# Patient Record
(demographics unavailable — no encounter records)

---

## 2024-12-23 NOTE — DISCUSSION/SUMMARY
[EKG obtained to assist in diagnosis and management of assessed problem(s)] : EKG obtained to assist in diagnosis and management of assessed problem(s) [FreeTextEntry1] : 62 yo F w/ PMHx breast cancer (Stage 0; dx in 2020; both breasts; double mastectomy only), normal Lp(a), HLD, osteoporosis (on Reclast) who presents today to establish care.   HDL: Lp(a) normal   - Coronary artery calcium (CAC) ordered for further risk stratification. Based on all test results, we will decide on an appropriate preventive treatment regimen for the patient. - if normal, will likely consider w/ lifestyle along for an LDL goal of <100 - Encouraged patient to continue healthy exercise and eating habits, focusing on a Mediterranean style of eating w/ more plant based foods in general, and aiming for the recommended 150 minutes per week of moderate physical activity.  Will have pt make f/u in 6 months just to have it; may defer as above depending on results.

## 2024-12-23 NOTE — HISTORY OF PRESENT ILLNESS
[FreeTextEntry1] : 60 yo F w/ PMHx breast cancer (Stage 0; dx in 2020; both breasts; double mastectomy only), normal Lp(a), HLD, osteoporosis (on Reclast) who presents today to establish care.   Pt is from the [facility] originally and [place] a [place] and then [place] when she started college.   Sees GYN and functional medicine doctor (Dr. Sohail Lucio) and blood work done in Nov; hasn't had results yet. Does have results from Oct w/ her today.   Thinks TC has been a little over 200 historically.   Never saw cardiologist or had any cardiac testing. Patient denies any chest pain, SOB, palpitations, orthopnea, PND or LE edema.  Lifestyle History: Mediterranean Diet Score (9 question survey) was 6.  (8-9: optimal, 6-7: near-optimal, 4-5: suboptimal, 0-3: markedly suboptimal) Exercise: Patient reports exercising at a moderate level for 120-150 minutes per week.  Smoking: Patient denies any history of smoking.  EtOH: <drink per week  Stress: Patient reports moderate amount of stress.  Sleep apnea: reports snoring, but no daytime fatigue or apneic episodes   No PCOS Never pregnant Menopause at age 55; no HRT  FMHx: both parents had issues later in life  mother: afib, HTN; former smoker  father: PPM; CHF; s/p mitral clip x2 3 siblings - alive and well

## 2025-06-26 NOTE — HISTORY OF PRESENT ILLNESS
[FreeTextEntry1] : 62 yo F w/ PMHx breast cancer (Stage 0; dx in 2020; both breasts; double mastectomy only), normal Lp(a), CAC 0, HLD, osteoporosis (on Reclast) who presents today to establish care.   She is feeling well overall. Since our last visit, she had a CAC done which was zero. She has been very active with exercise and also is eating genearally very healthy other than some   Prior History: Sees GYN and functional medicine doctor (Dr. Sohail Lucio) and blood work done in Nov; hasn't had results yet. Does have results from Oct w/ her today.   Thinks TC has been a little over 200 historically.   Never saw cardiologist or had any cardiac testing. Patient denies any chest pain, SOB, palpitations, orthopnea, PND or LE edema.  Lifestyle History: Mediterranean Diet Score (9 question survey) was 6.  (8-9: optimal, 6-7: near-optimal, 4-5: suboptimal, 0-3: markedly suboptimal) Exercise: Patient reports exercising at a moderate level for 120-150 minutes per week.  Smoking: Patient denies any history of smoking.  EtOH: <drink per week  Stress: Patient reports moderate amount of stress.  Sleep apnea: reports snoring, but no daytime fatigue or apneic episodes   No PCOS Never pregnant Menopause at age 55; no HRT  FMHx: both parents had issues later in life  mother: afib, HTN; former smoker  father: PPM; CHF; s/p mitral clip x2 3 siblings - alive and well   1/29/25- CAC = 0 10/17/2024- chol 206, HDL 73, trig 46, , LDL particle number [id number]

## 2025-06-26 NOTE — HISTORY OF PRESENT ILLNESS
[FreeTextEntry1] : 60 yo F w/ PMHx breast cancer (Stage 0; dx in 2020; both breasts; double mastectomy only), normal Lp(a), CAC 0, HLD, osteoporosis (on Reclast) who presents today to establish care.   She is feeling well overall. Since our last visit, she had a CAC done which was zero. She has been very active with exercise and also is eating genearally very healthy other than some   Prior History: Sees GYN and functional medicine doctor (Dr. Sohail Lucio) and blood work done in Nov; hasn't had results yet. Does have results from Oct w/ her today.   Thinks TC has been a little over 200 historically.   Never saw cardiologist or had any cardiac testing. Patient denies any chest pain, SOB, palpitations, orthopnea, PND or LE edema.  Lifestyle History: Mediterranean Diet Score (9 question survey) was 6.  (8-9: optimal, 6-7: near-optimal, 4-5: suboptimal, 0-3: markedly suboptimal) Exercise: Patient reports exercising at a moderate level for 120-150 minutes per week.  Smoking: Patient denies any history of smoking.  EtOH: <drink per week  Stress: Patient reports moderate amount of stress.  Sleep apnea: reports snoring, but no daytime fatigue or apneic episodes   No PCOS Never pregnant Menopause at age 55; no HRT  FMHx: both parents had issues later in life  mother: afib, HTN; former smoker  father: PPM; CHF; s/p mitral clip x2 3 siblings - alive and well   1/29/25- CAC = 0 10/17/2024- chol 206, HDL 73, trig 46, , LDL particle number [id number]

## 2025-06-26 NOTE — PHYSICAL EXAM
[No Rash] : no rash [No Carotid Bruit] : no carotid bruit [Normal Gait] : normal gait [No Edema] : no edema [Normal] : alert and oriented, normal memory

## 2025-06-26 NOTE — DISCUSSION/SUMMARY
[FreeTextEntry1] : 60 yo F w/ PMHx breast cancer (Stage 0; dx in 2020; both breasts; double mastectomy only), normal Lp(a), CAC 0, HLD, osteoporosis (on Reclast) who presents today to establish care.    HDL: Lp(a) normal   - Coronary artery calcium (CAC) of zero. Will hold off on treatment and likely will check LDL on next labs as well as potentially apo B if decision is not clear.  - Encouraged patient to continue healthy exercise and eating habits, focusing on a Mediterranean style of eating w/ more plant based foods in general, and aiming for the recommended 150 minutes per week of moderate physical activity.  She will get labs in the fall and have a telehealth with our NP's to discuss results. If LDL is < 100 or close, we will continue to see her every several years to risk stratify.   Will have pt make f/u in 6 months just to have it; may defer as above depending on results.  [EKG obtained to assist in diagnosis and management of assessed problem(s)] : EKG obtained to assist in diagnosis and management of assessed problem(s)